# Patient Record
Sex: MALE | Race: WHITE | NOT HISPANIC OR LATINO | Employment: UNEMPLOYED | URBAN - METROPOLITAN AREA
[De-identification: names, ages, dates, MRNs, and addresses within clinical notes are randomized per-mention and may not be internally consistent; named-entity substitution may affect disease eponyms.]

---

## 2017-08-08 PROBLEM — G56.02 LEFT CARPAL TUNNEL SYNDROME: Status: ACTIVE | Noted: 2017-08-08

## 2017-08-08 PROBLEM — M65.342 TRIGGER RING FINGER OF LEFT HAND: Status: ACTIVE | Noted: 2017-08-08

## 2017-10-04 ENCOUNTER — OFFICE VISIT (OUTPATIENT)
Dept: NEUROLOGY | Facility: CLINIC | Age: 57
End: 2017-10-04
Payer: MEDICAID

## 2017-10-04 VITALS
HEART RATE: 89 BPM | HEIGHT: 71 IN | BODY MASS INDEX: 32.55 KG/M2 | WEIGHT: 232.5 LBS | SYSTOLIC BLOOD PRESSURE: 178 MMHG | RESPIRATION RATE: 20 BRPM | DIASTOLIC BLOOD PRESSURE: 94 MMHG

## 2017-10-04 DIAGNOSIS — I10 ESSENTIAL HYPERTENSION: ICD-10-CM

## 2017-10-04 DIAGNOSIS — G40.309 NONINTRACTABLE GENERALIZED IDIOPATHIC EPILEPSY WITHOUT STATUS EPILEPTICUS: ICD-10-CM

## 2017-10-04 DIAGNOSIS — Z51.81 THERAPEUTIC DRUG MONITORING: ICD-10-CM

## 2017-10-04 PROCEDURE — 99999 PR PBB SHADOW E&M-EST. PATIENT-LVL V: CPT | Mod: PBBFAC,,, | Performed by: PSYCHIATRY & NEUROLOGY

## 2017-10-04 PROCEDURE — 99205 OFFICE O/P NEW HI 60 MIN: CPT | Mod: S$PBB,,, | Performed by: PSYCHIATRY & NEUROLOGY

## 2017-10-04 PROCEDURE — 99215 OFFICE O/P EST HI 40 MIN: CPT | Mod: PBBFAC,PN | Performed by: PSYCHIATRY & NEUROLOGY

## 2017-10-04 RX ORDER — PROMETHAZINE HYDROCHLORIDE 25 MG/1
25 TABLET ORAL
COMMUNITY
End: 2017-10-04 | Stop reason: SDUPTHER

## 2017-10-04 RX ORDER — OXCARBAZEPINE 300 MG/1
300 TABLET, FILM COATED ORAL 2 TIMES DAILY
COMMUNITY
End: 2017-12-11

## 2017-10-04 RX ORDER — GLIMEPIRIDE 2 MG/1
2 TABLET ORAL DAILY
Refills: 2 | COMMUNITY
Start: 2017-09-25 | End: 2017-12-14

## 2017-10-04 RX ORDER — TAMSULOSIN HYDROCHLORIDE 0.4 MG/1
1 CAPSULE ORAL NIGHTLY
Refills: 10 | COMMUNITY
Start: 2017-09-14

## 2017-10-04 NOTE — PROGRESS NOTES
"Subjective:      10/24/2017       Patient ID: Randy Deng is a right handed 57 y.o.  male who presents for seizures    History of Present Illness    Reviewed outpatient records from Dr. Rivera, referred for hx of TIA.  Reviewed outpatient neurology clinic from her from [facility] and over neurology February 2017; of note examiners indicated the patient was not a good historian, did not have medications and was not accompanied by anyone to clarify inconsistencies in his report.    His chief complaint at that time was of back pain.  Reported a history of low back pain with sciatica peripheral polyneuropathy, and remote seizure disorder.  Long-standing, presumed diabetic neuropathy.  History of seizures; history of childhood seizures, stated he was in the hospital for 47 days at [facility] as a child for seizures where they "had to put tubes on either side of my head into my brain, and they injected a dye or something for my seizures."    That visit referenced lumbar spine MRI with some degenerative disease, no neural foraminal narrowing, no spinal stenosis, no cord compromise.  Referenced needle EMG in July 2016 without evidence of lumbosacral radiculopathy, given prescription for physical therapy.  EMG apparently did show bilateral lower sensory neuropathy.  Was given prescription for duloxetine 60 mg daily and lidocaine 5% cream.  They did order an EEG.  I do not have any results of that study.     He states he had seizures as a child, had bilateral ventriculostomies, question of a tumor?.  Recalls being on Dilantin up to age 21, when seizures quieted down. No seizures until about a year and a half ago.      + aura, feels like gasping for air.  He knows he goes out, witnesses tell him he shakes all over 3-5 minutes.  Feels slow afterwards, confused.  Says he keeps a metal spoon to put in his mouth.  He says he was started on anticonvulsant by a Dr. Tesfaye - I have records from [facility] from February, however he " states he was seen just a month ago and has been started  On something but doesn't know what.  He had an EEG study done about three months ago.     He is not sure when his last seizure was, might have been this morning.  He recalls sitting at the edge of the bed, and for 3-4 minutes was shaking and trembling which he recalls. Laid back down following.     He does not drive, has a  Friend drive him.     Review of patient's allergies indicates:   Allergen Reactions    Lisinopril Other (See Comments)     Angioedema     Metformin Other (See Comments)     Renal issues     Penicillins      Current Outpatient Prescriptions   Medication Sig Dispense Refill    albuterol (VENTOLIN HFA) 90 mcg/actuation inhaler INHALE 1-2 puffs into lungs EVERY 6 HOURS AS NEEDED FOR WHEEZING      avanafil 200 mg Tab Take 200 mg by mouth.      duloxetine (CYMBALTA) 60 MG capsule Take 60 mg by mouth.      glimepiride (AMARYL) 2 MG tablet Take 2 mg by mouth once daily.  2    glimepiride (AMARYL) 4 MG tablet TK 1 T PO D WITH DAVID OR THE FIRST MAIN MEAL OF THE DAY      indomethacin (INDOCIN) 50 MG capsule TK ONE C PO TID PRF GOUT FLARE-UP      lidocaine (XYLOCAINE) 5 % Oint ointment ROLY TOPICALLY TO AFFECTED AREA D      losartan (COZAAR) 100 MG tablet Take 100 mg by mouth.      oxcarbazepine (TRILEPTAL) 300 MG Tab Take 300 mg by mouth 2 (two) times daily.      promethazine (PHENERGAN) 25 MG tablet Take 25 mg by mouth.      sildenafil (VIAGRA) 100 MG tablet Take 100 mg by mouth.      tamsulosin (FLOMAX) 0.4 mg Cp24 Take 1 capsule by mouth nightly.  10    trazodone (DESYREL) 50 MG tablet TK 1 T PO QD       No current facility-administered medications for this visit.        Past Medical History  Past Medical History:   Diagnosis Date    Diabetes mellitus, type 2     Hypertension        Past Surgical History  No past surgical history on file.    Family History  No family history on file.    Social History  Social History     Social  History    Marital status:      Spouse name: N/A    Number of children: N/A    Years of education: N/A     Occupational History    Not on file.     Social History Main Topics    Smoking status: Never Smoker    Smokeless tobacco: Never Used    Alcohol use Yes      Comment: socially    Drug use: No    Sexual activity: Not on file     Other Topics Concern    Not on file     Social History Narrative    No narrative on file        Review of Systems  Review of Systems   Constitutional: Negative for appetite change, fatigue, fever and unexpected weight change.   HENT: Negative for congestion, hearing loss, nosebleeds, sinus pressure and trouble swallowing.    Eyes: Negative for pain and visual disturbance.   Respiratory: Positive for cough. Negative for shortness of breath and wheezing.    Cardiovascular: Negative for chest pain and palpitations.   Gastrointestinal: Negative for abdominal pain, blood in stool, diarrhea, nausea and vomiting.   Endocrine: Negative for cold intolerance and heat intolerance.   Genitourinary: Negative for difficulty urinating, hematuria and urgency.   Musculoskeletal: Positive for arthralgias and myalgias. Negative for back pain, gait problem and neck pain.   Skin: Negative for rash and wound.   Neurological: Positive for seizures, weakness and numbness. Negative for dizziness.   Hematological: Bruises/bleeds easily.   Psychiatric/Behavioral: Negative for decreased concentration, dysphoric mood and sleep disturbance.       Objective:        Vitals:    10/04/17 0911   BP: (!) 178/94   Pulse: 89   Resp:      Body mass index is 32.43 kg/m².  Neurologic Exam     Mental Status   Oriented to person, place, and time.   Registration: recalls 3 of 3 objects. Recall at 5 minutes: recalls 2 of 3 objects.   Attention: normal. Concentration: decreased.   Speech: speech is normal   Level of consciousness: alert  Able to name object. Able to repeat. Normal comprehension.     Cranial Nerves      CN II   Visual fields full to confrontation.     CN III, IV, VI   Pupils are equal, round, and reactive to light.  Extraocular motions are normal.   CN III: no CN III palsy  CN VI: no CN VI palsy    CN V   Facial sensation intact.     CN VII   Facial expression full, symmetric.     CN IX, X   CN IX normal.   Palate: symmetric    CN XI   CN XI normal.     CN XII   CN XII normal.     Motor Exam   Muscle bulk: normal  Overall muscle tone: normal    Strength   Strength 5/5 except as noted. Decreased  on the left, give way weakness at the shoulders b/l and left leg     Gait, Coordination, and Reflexes     Gait  Gait: (limps, difficulty weight bearing on the left)    Coordination   Romberg: positive  Finger-nose-finger test: tremulous, mildly ataxic.  Tandem walking coordination: abnormal (falls to the right)    Tremor   Resting tremor: present  Intention tremor: present  Action tremor: left arm and right arm    Reflexes   Right brachioradialis: 1+  Left brachioradialis: 1+  Right biceps: 1+  Left biceps: 1+  Right triceps: 1+  Left triceps: 1+  Right patellar: 2+  Left patellar: 2+  Right achilles: 1+  Left achilles: 1+Tremulous with activity and sustained posture       Physical Exam   Constitutional: He is oriented to person, place, and time.   Eyes: EOM are normal. Pupils are equal, round, and reactive to light.   Neurological: He is oriented to person, place, and time. He has an abnormal Romberg Test and an abnormal Tandem Gait Test (falls to the right). Finger-nose-finger test: tremulous, mildly ataxic.   Reflex Scores:       Tricep reflexes are 1+ on the right side and 1+ on the left side.       Bicep reflexes are 1+ on the right side and 1+ on the left side.       Brachioradialis reflexes are 1+ on the right side and 1+ on the left side.       Patellar reflexes are 2+ on the right side and 2+ on the left side.       Achilles reflexes are 1+ on the right side and 1+ on the left side.  Psychiatric: His speech  is normal.         Data Review:     Reviewed outpatient records from [facility].  Lab studies June 1, 2017 include sodium 133, potassium 5.3, glucose 97, BUN 10, creatinine 0.97.  AST, ALT normal.  Total protein 7.6.  Uric acid 5.6.  HDL 81, LDL 34.  White blood cell count 9.5, platelet 279, hematocrit 39.3.    Brain MRI reports December 6, 2016 shows remote bilateral ventriculostomies.  No evidence of acute territorial infarct or intracranial hemorrhage.  Diffuse parenchymal volume loss without acute hydrocephalus.    Assessment:       1. Nonintractable generalized idiopathic epilepsy without status epilepticus    2. Essential hypertension    3. Therapeutic drug monitoring        Plan:         Problem List Items Addressed This Visit        Neuro    Nonintractable generalized idiopathic epilepsy without status epilepticus    Current Assessment & Plan     He is currently on therapy with oxcarbazepine; we will need to get the more recent records from neurology including lab studies and EEG to review to avoid unnecessary duplication of studies.  Advised him never to have anyone put anything in his mouth with seizure, reviewed seizure safety and precautions (heights, water, etc) and importance of compliance with medications.     Once we have more information, will need routine monitoring while on Trileptal, possible EEG or ambulatory monitoring, and will need him to come in next visit with someone that can provide additional history            Cardiac/Vascular    Essential hypertension       Other    Therapeutic drug monitoring        Over 60 minutes time spent with patient, > 50% in discussion and counseling with patient regarding diagnosis, prognosis and treatment strategies      Return in about 3 months (around 1/4/2018).        Patient information shared at the time of visit:      Thank you very much for the opportunity to assist in this patient's care.  If you have any questions or concerns, please  do not hesitate to contact me at any time.     Sincerely,  Cristofer Greenberg, DO

## 2017-10-16 ENCOUNTER — TELEPHONE (OUTPATIENT)
Dept: NEUROLOGY | Facility: CLINIC | Age: 57
End: 2017-10-16

## 2017-10-16 NOTE — TELEPHONE ENCOUNTER
Records received from [facility], sent to scanning.

## 2017-10-24 PROBLEM — Z51.81 THERAPEUTIC DRUG MONITORING: Status: ACTIVE | Noted: 2017-10-24

## 2017-10-24 PROBLEM — G40.309 NONINTRACTABLE GENERALIZED IDIOPATHIC EPILEPSY WITHOUT STATUS EPILEPTICUS: Status: ACTIVE | Noted: 2017-10-24

## 2017-10-24 PROBLEM — I10 ESSENTIAL HYPERTENSION: Status: ACTIVE | Noted: 2017-10-24

## 2017-10-24 NOTE — ASSESSMENT & PLAN NOTE
He is currently on therapy with oxcarbazepine; we will need to get the more recent records from neurology including lab studies and EEG to review to avoid unnecessary duplication of studies.  Advised him never to have anyone put anything in his mouth with seizure, reviewed seizure safety and precautions (heights, water, etc) and importance of compliance with medications.     Once we have more information, will need routine monitoring while on Trileptal, possible EEG or ambulatory monitoring, and will need him to come in next visit with someone that can provide additional history

## 2017-12-11 ENCOUNTER — OFFICE VISIT (OUTPATIENT)
Dept: NEUROLOGY | Facility: CLINIC | Age: 57
End: 2017-12-11
Payer: MEDICAID

## 2017-12-11 VITALS
BODY MASS INDEX: 33.13 KG/M2 | HEIGHT: 71 IN | RESPIRATION RATE: 18 BRPM | SYSTOLIC BLOOD PRESSURE: 163 MMHG | WEIGHT: 236.63 LBS | DIASTOLIC BLOOD PRESSURE: 77 MMHG

## 2017-12-11 DIAGNOSIS — G63 POLYNEUROPATHY ASSOCIATED WITH UNDERLYING DISEASE: ICD-10-CM

## 2017-12-11 DIAGNOSIS — Z92.89 HISTORY OF VENTRICULOPERITONEAL SHUNTING: ICD-10-CM

## 2017-12-11 DIAGNOSIS — G40.309 NONINTRACTABLE GENERALIZED IDIOPATHIC EPILEPSY WITHOUT STATUS EPILEPTICUS: Primary | ICD-10-CM

## 2017-12-11 DIAGNOSIS — Z51.81 THERAPEUTIC DRUG MONITORING: ICD-10-CM

## 2017-12-11 DIAGNOSIS — M54.32 SCIATICA OF LEFT SIDE: ICD-10-CM

## 2017-12-11 PROCEDURE — 99214 OFFICE O/P EST MOD 30 MIN: CPT | Mod: S$PBB,,, | Performed by: PSYCHIATRY & NEUROLOGY

## 2017-12-11 PROCEDURE — 99214 OFFICE O/P EST MOD 30 MIN: CPT | Mod: PBBFAC,PN | Performed by: PSYCHIATRY & NEUROLOGY

## 2017-12-11 PROCEDURE — 99999 PR PBB SHADOW E&M-EST. PATIENT-LVL IV: CPT | Mod: PBBFAC,,, | Performed by: PSYCHIATRY & NEUROLOGY

## 2017-12-11 RX ORDER — GLUCOSAM/CHON-MSM1/C/MANG/BOSW 500-416.6
TABLET ORAL
Refills: 1 | COMMUNITY
Start: 2017-10-23 | End: 2018-09-07

## 2017-12-11 RX ORDER — GLIMEPIRIDE 4 MG/1
4 TABLET ORAL
COMMUNITY
Start: 2017-11-21 | End: 2018-02-15

## 2017-12-11 RX ORDER — TRAZODONE HYDROCHLORIDE 50 MG/1
50 TABLET ORAL
COMMUNITY
Start: 2017-11-13 | End: 2017-12-14

## 2017-12-11 RX ORDER — BUDESONIDE AND FORMOTEROL FUMARATE DIHYDRATE 160; 4.5 UG/1; UG/1
2 AEROSOL RESPIRATORY (INHALATION) EVERY 12 HOURS
COMMUNITY
Start: 2017-10-26

## 2017-12-11 RX ORDER — BLOOD-GLUCOSE METER
EACH MISCELLANEOUS
Refills: 0 | COMMUNITY
Start: 2017-10-23 | End: 2018-09-07

## 2017-12-11 RX ORDER — TAMSULOSIN HYDROCHLORIDE 0.4 MG/1
1 CAPSULE ORAL
COMMUNITY
Start: 2017-09-14 | End: 2017-12-14

## 2017-12-11 RX ORDER — OXCARBAZEPINE 600 MG/1
600 TABLET, FILM COATED ORAL 2 TIMES DAILY
Qty: 60 TABLET | Refills: 11 | Status: SHIPPED | OUTPATIENT
Start: 2017-12-11 | End: 2017-12-14

## 2017-12-11 RX ORDER — LIDOCAINE 50 MG/G
OINTMENT TOPICAL
COMMUNITY
Start: 2017-11-25 | End: 2017-12-14

## 2017-12-11 RX ORDER — OXCARBAZEPINE 300 MG/1
300 TABLET, FILM COATED ORAL
COMMUNITY
End: 2017-12-11

## 2017-12-11 RX ORDER — CALCIUM CITRATE/VITAMIN D3 200MG-6.25
TABLET ORAL
Refills: 1 | COMMUNITY
Start: 2017-11-20 | End: 2018-09-07

## 2017-12-11 RX ORDER — AMLODIPINE BESYLATE 10 MG/1
TABLET ORAL
Refills: 1 | COMMUNITY
Start: 2017-11-22 | End: 2018-09-07

## 2017-12-11 RX ORDER — DICLOFENAC SODIUM 10 MG/G
GEL TOPICAL
Refills: 0 | COMMUNITY
Start: 2017-10-26 | End: 2018-09-07

## 2017-12-11 RX ORDER — NAPROXEN 500 MG/1
500 TABLET ORAL 2 TIMES DAILY PRN
Refills: 0 | COMMUNITY
Start: 2017-10-23 | End: 2017-12-11

## 2017-12-11 RX ORDER — METHYLPREDNISOLONE 4 MG/1
TABLET ORAL
Qty: 1 PACKAGE | Refills: 0 | Status: SHIPPED | OUTPATIENT
Start: 2017-12-11 | End: 2018-01-01

## 2017-12-11 RX ORDER — METOPROLOL TARTRATE 25 MG/1
25 TABLET, FILM COATED ORAL
COMMUNITY
Start: 2017-10-19 | End: 2018-09-07

## 2017-12-11 RX ORDER — LOSARTAN POTASSIUM 25 MG/1
25 TABLET ORAL DAILY
Refills: 1 | COMMUNITY
Start: 2017-11-30 | End: 2018-09-07

## 2017-12-11 NOTE — PROGRESS NOTES
"Subjective:         Patient ID: Randy Deng is a 57 y.o.  male who presents for follow up of generalized epilepsy since childhooid    Initial / Last History of Present Illness:  Reviewed outpatient records from Dr. Rivera, referred for hx of TIA.  Reviewed outpatient neurology clinic from her from [facility] and over neurology February 2017; of note examiners indicated the patient was not a good historian, did not have medications and was not accompanied by anyone to clarify inconsistencies in his report.     His chief complaint at that time was of back pain.  Reported a history of low back pain with sciatica peripheral polyneuropathy, and remote seizure disorder.  Long-standing, presumed diabetic neuropathy.  History of seizures; history of childhood seizures, stated he was in the hospital for 47 days at [facility] as a child for seizures where they "had to put tubes on either side of my head into my brain, and they injected a dye or something for my seizures."     That visit referenced lumbar spine MRI with some degenerative disease, no neural foraminal narrowing, no spinal stenosis, no cord compromise.  Referenced needle EMG in July 2016 without evidence of lumbosacral radiculopathy, given prescription for physical therapy.  EMG apparently did show bilateral lower sensory neuropathy.  Was given prescription for duloxetine 60 mg daily and lidocaine 5% cream.  They did order an EEG.  I do not have any results of that study.      He states he had seizures as a child, had bilateral ventriculostomies, question of a tumor?.  Recalls being on Dilantin up to age 21, when seizures quieted down. No seizures until about a year and a half ago.       + aura, feels like gasping for air.  He knows he goes out, witnesses tell him he shakes all over 3-5 minutes.  Feels slow afterwards, confused.  Says he keeps a metal spoon to put in his mouth.  He says he was started on anticonvulsant by a Dr. Tesfaye - I have records from " [facility] from February, however he states he was seen just a month ago and has been started  On something but doesn't know what.  He had an EEG study done about three months ago.      He is not sure when his last seizure was, might have been this morning.  He recalls sitting at the edge of the bed, and for 3-4 minutes was shaking and trembling which he recalls. Laid back down following.      He does not drive, has a  Friend drive him.    Interval history since last visit:    Records available since last visit from [facility] resident clinic:    MRI brain report 12/2016 - remote bilateral ventriculostomies.  No acute abnormalities.  Diffuse volume loss, no acute hydrocephalus.     EEG 7/2017 - showed intermittent bitemporal slow waves occurring independently, L>R; no epileptiform activity.    9/2017 neurology clinic follow up - (Dr. Eng, [facility]) started  BID    He reports having had one seizure in the past 2 months.  Per his friend lasted about 90 seconds, + bit tongue.  Was briefly post-ictal.  He is taking the Trileptal 300 mg BID, at this point no significant side effects.     + sciatic pain shooting down the left leg, + back pain.  All day, every day.  Has been a problem for the past 7 years.  Naproxyn not helping, ibuprofen seems to work better    Review of patient's allergies indicates:   Allergen Reactions    Lisinopril Other (See Comments)     Angioedema     Metformin Other (See Comments)     Renal issues     Penicillins      Current Outpatient Prescriptions   Medication Sig Dispense Refill    albuterol (VENTOLIN HFA) 90 mcg/actuation inhaler INHALE 1-2 puffs into lungs EVERY 6 HOURS AS NEEDED FOR WHEEZING      amLODIPine (NORVASC) 10 MG tablet Take 1 tablet (10 mg total) by mouth daily.  1    avanafil 200 mg Tab Take 200 mg by mouth.      blood-gluc meter-wrist BP mntr Jane Use as directed      budesonide-formoterol 160-4.5 mcg (SYMBICORT) 160-4.5 mcg/actuation  HFAA Inhale 2 puffs into the lungs.      duloxetine (CYMBALTA) 60 MG capsule Take 60 mg by mouth.      glimepiride (AMARYL) 2 MG tablet Take 2 mg by mouth once daily.  2    glimepiride (AMARYL) 4 MG tablet TK 1 T PO D WITH DAVID OR THE FIRST MAIN MEAL OF THE DAY      glimepiride (AMARYL) 4 MG tablet Take 4 mg by mouth.      lidocaine (XYLOCAINE) 5 % Oint ointment ROLY TOPICALLY TO AFFECTED AREA D      lidocaine (XYLOCAINE) 5 % Oint ointment       losartan (COZAAR) 100 MG tablet Take 100 mg by mouth.      metoprolol tartrate (LOPRESSOR) 25 MG tablet Take 25 mg by mouth.      promethazine (PHENERGAN) 25 MG tablet Take 25 mg by mouth.      sildenafil (VIAGRA) 100 MG tablet Take 100 mg by mouth.      tamsulosin (FLOMAX) 0.4 mg Cp24 Take 1 capsule by mouth nightly.  10    tamsulosin (FLOMAX) 0.4 mg Cp24 Take 1 capsule by mouth.      trazodone (DESYREL) 50 MG tablet TK 1 T PO QD      traZODone (DESYREL) 50 MG tablet Take 50 mg by mouth.      diclofenac sodium 1 % Gel APPLY TWO grams TOPICALLY EVERY 6 HOURS AS NEEDED  0    losartan (COZAAR) 25 MG tablet Take 25 mg by mouth once daily.  1    methylPREDNISolone (MEDROL DOSEPACK) 4 mg tablet use as directed 1 Package 0    OXcarbazepine (TRILEPTAL) 600 MG Tab Take 1 tablet (600 mg total) by mouth 2 (two) times daily. 60 tablet 11    TRUE METRIX GLUCOSE METER Misc Use as directed  0    TRUE METRIX GLUCOSE TEST STRIP Strp test FOUR TIMES DAILY BEFORE MEALS and nightly  1    TRUEPLUS LANCETS 30 gauge Misc test FOUR TIMES DAILY BEFORE MEALS and nightly  1     No current facility-administered medications for this visit.          Review of Systems  Review of Systems    Objective:        Vitals:    12/11/17 0808   BP: (!) 163/77   Resp: 18     Body mass index is 33 kg/m².  Neurologic Exam     Mental Status   Oriented to person, place, and time.   Attention: normal. Concentration: normal.   Speech: speech is normal   Level of consciousness: alert    Cranial Nerves      CN II   Visual fields full to confrontation.     CN III, IV, VI   Pupils are equal, round, and reactive to light.  Extraocular motions are normal.   CN III: no CN III palsy  CN VI: no CN VI palsy  Nystagmus: none   Ophthalmoparesis: none    CN V   Facial sensation intact.     CN VII   Facial expression full, symmetric.     CN VIII   CN VIII normal.     CN IX, X   CN IX normal.     CN XI   CN XI normal.     CN XII   CN XII normal.     Motor Exam   Muscle bulk: normal  Overall muscle tone: normal  Right arm pronator drift: absent  Left arm pronator drift: absentNo weakness, pain on effort left leg     Gait, Coordination, and Reflexes     Tremor   Resting tremor: absentPostural tremor b/l UE       Physical Exam   Constitutional: He is oriented to person, place, and time.   Eyes: EOM are normal. Pupils are equal, round, and reactive to light.   Neurological: He is oriented to person, place, and time.   Psychiatric: His speech is normal.         Data Review:    Assessment:        1. Nonintractable generalized idiopathic epilepsy without status epilepticus    2. History of ventriculoperitoneal shunting    3. Polyneuropathy associated with underlying disease    4. Therapeutic drug monitoring    5. Sciatica of left side           Plan:         Problem List Items Addressed This Visit        Neuro    Nonintractable generalized idiopathic epilepsy without status epilepticus - Primary    Current Assessment & Plan     Encouraged to keep written log of seizure activity  Check drug level, monitoring labs.  Anticipate he will have some hyponatremia - need to monitor         Relevant Medications    OXcarbazepine (TRILEPTAL) 600 MG Tab    History of ventriculoperitoneal shunting    Polyneuropathy associated with underlying disease    Overview     Diabetes            Other    Therapeutic drug monitoring    Current Assessment & Plan     CBC, CMP, Oxcarbazepine level         Relevant Orders    CBC auto differential    Comprehensive  metabolic panel    Oxcarbazepine level      Other Visit Diagnoses     Sciatica of left side        Relevant Orders    MRI Lumbar Spine Without Contrast    Ambulatory Consult to Back & Spine Clinic    Ambulatory consult to Physical Therapy      Stop taking the naprosyn, and do not take any ibuprofen while on the Medrol dosepak.  Will set up an MRI of the low back and get you in to physical therapy, and the spine clinic    Increase the Trileptal to 600 mg one pill twice a day    Keep a written record of any seizures you are having so we can monitor them over time, and let me know if you have more on the higher dose    Return in about 3 months (around 3/11/2018).       Thank you very much for the opportunity to assist in this patient's care.  If you have any questions or concerns, please do not hesitate to contact me at any time.     Sincerely,  Cristofer Greenberg, DO

## 2017-12-11 NOTE — PATIENT INSTRUCTIONS
Stop taking the naprosyn, and do not take any ibuprofen while on the Medrol dosepak.  Will set up an MRI of the low back and get you in to physical therapy, and the spine clinic    Increase the Trileptal to 600 mg one pill twice a day    Keep a written record of any seizures you are having so we can monitor them over time, and let me know if you have more on the higher dose

## 2017-12-11 NOTE — ASSESSMENT & PLAN NOTE
Encouraged to keep written log of seizure activity  Check drug level, monitoring labs.  Anticipate he will have some hyponatremia - need to monitor

## 2017-12-14 ENCOUNTER — OFFICE VISIT (OUTPATIENT)
Dept: INTERNAL MEDICINE | Facility: CLINIC | Age: 57
End: 2017-12-14
Payer: MEDICAID

## 2017-12-14 VITALS
RESPIRATION RATE: 16 BRPM | WEIGHT: 239.38 LBS | OXYGEN SATURATION: 98 % | BODY MASS INDEX: 33.51 KG/M2 | HEIGHT: 71 IN | DIASTOLIC BLOOD PRESSURE: 82 MMHG | SYSTOLIC BLOOD PRESSURE: 156 MMHG | TEMPERATURE: 98 F | HEART RATE: 97 BPM

## 2017-12-14 DIAGNOSIS — M54.16 LEFT LUMBAR RADICULITIS: Primary | ICD-10-CM

## 2017-12-14 PROCEDURE — 99204 OFFICE O/P NEW MOD 45 MIN: CPT | Mod: ,,, | Performed by: PHYSICAL MEDICINE & REHABILITATION

## 2017-12-14 NOTE — PROGRESS NOTES
Subjective:       Patient ID: Randy Deng is a 57 y.o. male.    Chief Complaint: sciatic nerve pain    This is a 57-year-old man who sees Dr. Em in [place] for his primary care. He has a history of hypertension and diabetes associated with diabetic peripheral neuropathy. He also has a history of epilepsy and is currently under the care of Dr. Greenberg, a neurologist. He denies any personal history of cancer He presents to me with a seven-year history of left posterior leg discomfort from the left gluteal region down to the distal portion of the left calf. He claims the pain is intermittent in nature and better when he lies on his right side. When he lies on his right side he can get his pain level down to 0. At its worst the pain is an 8 out of 10 and is worsened by sitting and riding in a car or walking. Bending forward worsens his pain. He denies any changes in his bowel and bladder habits since the onset of his pain. He denies weakness. He formerly was self-employed in seafood industry but has not been employed for many years due to the inability to lift. Despite the fact that the patient has a long history of left leg radicular pains, he claims he has had no specific treatment for his condition. I did review his neurologist notes and I see that he presented to his neurologist on or about 12/11/2017 with complaints of left leg pain. He was prescribed a Medrol Dosepak which the patient hasn't started yet. He was also instructed to go to outpatient physical therapy which he has not yet done. An MRI of the lumbar spine was ordered and is to be done on 12/26/2017.    The patient denies chest pain, shortness of breath, fever, chills, night sweats or unexpected weight loss. The patient denies any changes to bowel and bladder function since the onset of symptoms. Specifically denies fecal incontinence. Specifically denies urinary retention or incontinence. Denies  perineal or perianal numbness.     Oswestry low  "back pain disability questionnaire score was 64% suggesting that the patient's pain impinges on all aspects of his life.      Review of Systems   Constitutional: Negative for chills, diaphoresis, fatigue, fever and unexpected weight change.   HENT: Negative for trouble swallowing.    Eyes: Negative for visual disturbance.   Respiratory: Negative for shortness of breath.    Cardiovascular: Negative for chest pain.   Gastrointestinal: Negative for abdominal pain, constipation, diarrhea, nausea and vomiting.   Genitourinary: Negative for difficulty urinating.   Musculoskeletal: Negative for arthralgias, back pain, gait problem, joint swelling, myalgias, neck pain and neck stiffness.   Neurological: Negative for dizziness, speech difficulty, weakness, light-headedness, numbness and headaches.       Objective:      Physical Exam   Constitutional: He is oriented to person, place, and time. He appears well-developed.   Neurological: He is alert and oriented to person, place, and time. He has normal strength and normal reflexes.   The patient had increased pain at the left lower lumbar region radiating into his left thigh with flexion to about 30°. Extension of the lumbar spine improved his symptoms.  Straight leg raise was painless on the right and reproduced the patient's left leg radicular symptoms.    For the purposes of today's evaluation, a "normal" physical examination includes the following components:    Standing Evaluation  Inspection-No external lesions or atrophy of the cervical or lumbar spine was noted     Palpation-No point tenderness or palpable masses were noted about the cervical or lumbar spine      Heel walk-No evidence of ankle dorsiflexion weakness    Toe walk- No evidence of plantar flexion weakness    Saddle sensation- No loss of sensation to light touch in the upper gluteal cleft     Seated  Cervical ROM- Full painless ROM in flexion, extension, rotation and lateral bending    Spurlings maneuver- " No reproduction or worsening of cervical radicular symptoms with rotation in conjunction with extension of the cervical spine    Reflexes- +1-+2 reflexes at the following:   C5-Biceps   C6-Brachioradialis   C7-Triceps   L3/4-Patellar   S1-Achilles     Lara- No evidence for upper motor neuron disease    Babinski- No evidence for upper motor neuron disease    Clonus-No evidence for upper motor neuron disease      ANA LUISA- No back or groin pain with flexion, abduction and external rotation of the hips    Prone extension if possible- Five repetitions improves or has no effect on back pain symptoms    Strength testing- 5/5 strength in the following muscle groups:  C5-Elbow flexion  C6-Wrist extension  C7-Elbow extension  C8-Finger flexion  T1-Finger abduction  L2-Hip flexion  L3-Knee extension  L4-Ankle dorsiflexion  L5-Great toe extension  S1-Ankle plantar flexion     Nursing note and vitals reviewed.      Assessment:    1. Left lumbar radiculitis           Plan:     the patient presents with chronic left leg radicular symptoms. He has positive straight leg raising consistent with his diagnosis and presenting symptoms. I do not detect any hard neurological deficits on this evaluation today. Certainly no evidence for any neurological red flags. The patient presents in no acute distress and I agree with the treatment plan as outlined by his neurologist. He needs to start his Medrol Dosepak and physical therapy as prescribed. I will see him back in clinic after he has the MRI

## 2018-01-19 ENCOUNTER — TELEPHONE (OUTPATIENT)
Dept: NEUROLOGY | Facility: CLINIC | Age: 58
End: 2018-01-19

## 2018-01-19 NOTE — TELEPHONE ENCOUNTER
----- Message from Tereza Andujar sent at 1/19/2018  9:37 AM CST -----  Schedule MRI and blood work for the same day.  Please call [phone]

## 2018-02-01 ENCOUNTER — HOSPITAL ENCOUNTER (OUTPATIENT)
Dept: RADIOLOGY | Facility: HOSPITAL | Age: 58
Discharge: HOME | End: 2018-02-01
Attending: PSYCHIATRY & NEUROLOGY
Payer: MEDICAID

## 2018-02-01 DIAGNOSIS — M54.32 SCIATICA OF LEFT SIDE: ICD-10-CM

## 2018-02-01 PROCEDURE — 72148 MRI LUMBAR SPINE W/O DYE: CPT | Mod: 26,,, | Performed by: RADIOLOGY

## 2018-02-01 PROCEDURE — 72148 MRI LUMBAR SPINE W/O DYE: CPT | Mod: TC,PO

## 2018-02-08 ENCOUNTER — TELEPHONE (OUTPATIENT)
Dept: NEUROLOGY | Facility: CLINIC | Age: 58
End: 2018-02-08

## 2018-02-08 NOTE — TELEPHONE ENCOUNTER
----- Message from Gildardo Little sent at 2/8/2018 11:30 AM CST -----  Contact: same  Patient called in and stated he got all of his test scheduled that Dr. Greenberg wanted him to have and wanted to see when he could reschedule his appt that he missed in December??  This all dates back to 10/2017.    Patient call back number is [phone]

## 2018-02-15 ENCOUNTER — OFFICE VISIT (OUTPATIENT)
Dept: NEUROLOGY | Facility: CLINIC | Age: 58
End: 2018-02-15
Payer: MEDICAID

## 2018-02-15 VITALS
WEIGHT: 231.13 LBS | SYSTOLIC BLOOD PRESSURE: 142 MMHG | RESPIRATION RATE: 17 BRPM | HEIGHT: 71 IN | DIASTOLIC BLOOD PRESSURE: 72 MMHG | BODY MASS INDEX: 32.36 KG/M2

## 2018-02-15 DIAGNOSIS — F10.19 DISORDER DUE TO ALCOHOL ABUSE: Primary | ICD-10-CM

## 2018-02-15 DIAGNOSIS — G63 POLYNEUROPATHY ASSOCIATED WITH UNDERLYING DISEASE: ICD-10-CM

## 2018-02-15 DIAGNOSIS — G40.309 NONINTRACTABLE GENERALIZED IDIOPATHIC EPILEPSY WITHOUT STATUS EPILEPTICUS: ICD-10-CM

## 2018-02-15 DIAGNOSIS — Z92.89 HISTORY OF VENTRICULOPERITONEAL SHUNTING: ICD-10-CM

## 2018-02-15 PROCEDURE — 99215 OFFICE O/P EST HI 40 MIN: CPT | Mod: S$PBB,,, | Performed by: PSYCHIATRY & NEUROLOGY

## 2018-02-15 PROCEDURE — 99999 PR PBB SHADOW E&M-EST. PATIENT-LVL III: CPT | Mod: PBBFAC,,, | Performed by: PSYCHIATRY & NEUROLOGY

## 2018-02-15 PROCEDURE — 99213 OFFICE O/P EST LOW 20 MIN: CPT | Mod: PBBFAC,PN | Performed by: PSYCHIATRY & NEUROLOGY

## 2018-02-15 NOTE — PROGRESS NOTES
"Subjective:          Patient ID: Randy Deng is a 57 y.o.  male who presents for follow up of seizures    Initial / Last History of Present Illness:    Reviewed outpatient records from Dr. Rivera, referred for hx of TIA.  Reviewed outpatient neurology clinic from her from [facility] and over neurology February 2017; of note examiners indicated the patient was not a good historian, did not have medications and was not accompanied by anyone to clarify inconsistencies in his report.     His chief complaint at that time was of back pain.  Reported a history of low back pain with sciatica peripheral polyneuropathy, and remote seizure disorder.  Long-standing, presumed diabetic neuropathy.  History of seizures; history of childhood seizures, stated he was in the hospital for 47 days at [facility] as a child for seizures where they "had to put tubes on either side of my head into my brain, and they injected a dye or something for my seizures."     That visit referenced lumbar spine MRI with some degenerative disease, no neural foraminal narrowing, no spinal stenosis, no cord compromise.  Referenced needle EMG in July 2016 without evidence of lumbosacral radiculopathy, given prescription for physical therapy.  EMG apparently did show bilateral lower sensory neuropathy.  Was given prescription for duloxetine 60 mg daily and lidocaine 5% cream.  They did order an EEG.  I do not have any results of that study.      He states he had seizures as a child, had bilateral ventriculostomies, question of a tumor?.  Recalls being on Dilantin up to age 21, when seizures quieted down. No seizures until about a year and a half ago.       + aura, feels like gasping for air.  He knows he goes out, witnesses tell him he shakes all over 3-5 minutes.  Feels slow afterwards, confused.  Says he keeps a metal spoon to put in his mouth.  He says he was started on anticonvulsant by a Dr. Tesfaye - I have records from [facility] from February, " however he states he was seen just a month ago and has been started  On something but doesn't know what.  He had an EEG study done about three months ago.      He is not sure when his last seizure was, might have been this morning.  He recalls sitting at the edge of the bed, and for 3-4 minutes was shaking and trembling which he recalls. Laid back down following.      He does not drive, has a  Friend drive him.     12/11/17:     Records available since last visit from [facility] Neurology resident clinic:     MRI brain report 12/2016 - remote bilateral ventriculostomies.  No acute abnormalities.  Diffuse volume loss, no acute hydrocephalus.      EEG 7/2017 - showed intermittent bitemporal slow waves occurring independently, L>R; no epileptiform activity.     9/2017 neurology clinic follow up - (Dr. Eng, [facility] resident clinic) started  BID     He reports having had one seizure in the past 2 months.  Per his friend lasted about 90 seconds, + bit tongue.  Was briefly post-ictal.  He is taking the Trileptal 300 mg BID, at this point no significant side effects.      + sciatic pain shooting down the left leg, + back pain.  All day, every day.  Has been a problem for the past 7 years.  Naproxyn not helping, ibuprofen seems to work better    2/15/17 Interval history since last visit:    December 14, 2017 saw Dr. Robles in internal medicine with attention to his radicular symptoms, agreed with medrol dosepak, PT and imaging.     He is taking the Trileptal 600 mg BID; he feels like he is tolerating the medication well but his wife is concerned he may be having side effects - he seems more spaced out, is also more short tempered.  She feels he is more easily angered or agitated. We discussed his drinking - he is drinking between 6-8 beers a day, usually at home.  He has gone through detox in the hospital at [facility] in the past, had significant withdrawal sx.     Pain in the left leg did respond briefly  to medrol dose kaia, had 2-3 weeks of relief but has come back.     Review of patient's allergies indicates:   Allergen Reactions    Lisinopril Other (See Comments)     Angioedema     Metformin Other (See Comments)     Renal issues     Penicillins      Current Outpatient Prescriptions   Medication Sig Dispense Refill    amLODIPine (NORVASC) 10 MG tablet Take 1 tablet (10 mg total) by mouth daily.  1    blood-gluc meter-wrist BP mntr Jane Use as directed      budesonide-formoterol 160-4.5 mcg (SYMBICORT) 160-4.5 mcg/actuation HFAA Inhale 2 puffs into the lungs.      diclofenac sodium 1 % Gel APPLY TWO grams TOPICALLY EVERY 6 HOURS AS NEEDED  0    duloxetine (CYMBALTA) 60 MG capsule Take 60 mg by mouth.      INSULIN GLARGINE,HUM.REC.ANLOG (LANTUS SUBQ) Inject into the skin.      lidocaine (XYLOCAINE) 5 % Oint ointment ROLY TOPICALLY TO AFFECTED AREA D      losartan (COZAAR) 25 MG tablet Take 25 mg by mouth once daily.  1    metoprolol tartrate (LOPRESSOR) 25 MG tablet Take 25 mg by mouth.      tamsulosin (FLOMAX) 0.4 mg Cp24 Take 1 capsule by mouth nightly.  10    trazodone (DESYREL) 50 MG tablet TK 1 T PO QD      TRUE METRIX GLUCOSE METER Misc Use as directed  0    TRUE METRIX GLUCOSE TEST STRIP Strp test FOUR TIMES DAILY BEFORE MEALS and nightly  1    TRUEPLUS LANCETS 30 gauge Misc test FOUR TIMES DAILY BEFORE MEALS and nightly  1    promethazine (PHENERGAN) 25 MG tablet Take 25 mg by mouth.       No current facility-administered medications for this visit.          Review of Systems  Review of Systems    Objective:        Vitals:    02/15/18 0908   BP: (!) 142/72   Resp: 17     Body mass index is 32.23 kg/m².  Neurologic Exam    Physical Exam      Data Review:  Narrative     L2-3:  There is a minimal posterior disc bulge extending less than 2 mm into the spinal canal. There is minimal thecal sac compression and there is no foraminal compromise.    L3-4:  There is no significant compromise of the  spinal canal or foramina.    L4-5:  There is degenerative facet arthrosis with ligamentum flavum thickening, joint space narrowing, and posterior periarticular osteophyte formation. The facet arthrosis results in 3 mm anterior L4 subluxation. There is minimal L4-5 disc bulge and there is mild bilateral foraminal stenosis.    L5-S1:  There is a minimal posterior disc bulge causing no nerve root impingement or foraminal compromise.    Vertebral body heights are well-maintained and bone marrow signal is unremarkable. The distal spinal cord appears normal.   Impression       1. L2-3 and L5-S1 minimal disc bulges.  2. L5-S1 degenerative facet arthrosis causing mild anterior L4 subluxation. There is minimal L4-5 disc bulge with mild bilateral foraminal stenosis.      Electronically signed by: Gilbert Erickson  Date: 02/01/18  Time: 11:40        Assessment:        1. Disorder due to alcohol abuse    2. Polyneuropathy associated with underlying disease    3. Nonintractable generalized idiopathic epilepsy without status epilepticus    4. History of ventriculoperitoneal shunting           Plan:         Problem List Items Addressed This Visit        Neuro    Nonintractable generalized idiopathic epilepsy without status epilepticus    History of ventriculoperitoneal shunting    Polyneuropathy associated with underlying disease    Overview     Diabetes            Psychiatric    Disorder due to alcohol abuse - Primary    Current Assessment & Plan     Spoke very frankly with him and his wife about his drinking - he has a significant history of drinking, and even though it is less than previous levels is on a daily basis, in the setting of medications which will not interact well.  Very concerned about his risk of withdrawal and seizure or toxicity with medications.  Counseled him regarding inpatient treatment facilities, and inpatient detox centers.  I gave him some resources and encouraged him to go for detox as soon as possible.   He is concerned about financial issues, new business etc. but will consider within the next few weeks.                 No Follow-up on file.     Over 40 minutes time spent with patient, > 50% in discussion and counseling with patient regarding diagnosis, prognosis and treatment strategies    Thank you very much for the opportunity to assist in this patient's care.  If you have any questions or concerns, please do not hesitate to contact me at any time.     Sincerely,  Cristofer Greenberg, DO

## 2018-03-01 PROBLEM — F10.19: Status: ACTIVE | Noted: 2018-03-01

## 2018-03-01 NOTE — ASSESSMENT & PLAN NOTE
Spoke very frankly with him and his wife about his drinking - he has a significant history of drinking, and even though it is less than previous levels is on a daily basis, in the setting of medications which will not interact well.  Very concerned about his risk of withdrawal and seizure or toxicity with medications.  Counseled him regarding inpatient treatment facilities, and inpatient detox centers.  I gave him some resources and encouraged him to go for detox as soon as possible.  He is concerned about financial issues, new business etc. but will consider within the next few weeks.

## 2018-09-10 ENCOUNTER — TELEPHONE (OUTPATIENT)
Dept: NEUROLOGY | Facility: CLINIC | Age: 58
End: 2018-09-10
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
## 2018-09-10 NOTE — TELEPHONE ENCOUNTER
Spoke with sister Abril, she stated she was trying to get in touch with Dr Hart office, not Dr Greenberg's office to make an appointment. She want to be seen here on the [facility].

## 2018-09-12 ENCOUNTER — TELEPHONE (OUTPATIENT)
Dept: NEUROLOGY | Facility: CLINIC | Age: 58
End: 2018-09-12
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
## 2018-09-14 ENCOUNTER — TELEPHONE (OUTPATIENT)
Dept: NEUROSURGERY | Facility: CLINIC | Age: 58
End: 2018-09-14
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
## 2018-09-17 ENCOUNTER — TELEPHONE (OUTPATIENT)
Dept: NEUROSURGERY | Facility: CLINIC | Age: 58
End: 2018-09-17
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
## 2018-09-19 ENCOUNTER — INITIAL CONSULT (OUTPATIENT)
Dept: NEUROSURGERY | Facility: CLINIC | Age: 58
End: 2018-09-19
Payer: MEDICAID

## 2018-09-19 ENCOUNTER — HOSPITAL ENCOUNTER (OUTPATIENT)
Dept: RADIOLOGY | Facility: HOSPITAL | Age: 58
Discharge: HOME | End: 2018-09-19
Attending: PHYSICIAN ASSISTANT
Payer: MEDICAID

## 2018-09-19 VITALS
HEART RATE: 90 BPM | HEIGHT: 70 IN | SYSTOLIC BLOOD PRESSURE: 123 MMHG | WEIGHT: 220 LBS | BODY MASS INDEX: 31.5 KG/M2 | DIASTOLIC BLOOD PRESSURE: 72 MMHG

## 2018-09-19 DIAGNOSIS — G93.89 MIDLINE SHIFT OF BRAIN DUE TO HEMATOMA: ICD-10-CM

## 2018-09-19 DIAGNOSIS — S06.2X0S MIDLINE SHIFT OF BRAIN DUE TO HEMATOMA: ICD-10-CM

## 2018-09-19 DIAGNOSIS — I62.03 CHRONIC SUBDURAL HEMATOMA: Primary | ICD-10-CM

## 2018-09-19 DIAGNOSIS — Z01.818 PRE-OP EVALUATION: Primary | ICD-10-CM

## 2018-09-19 DIAGNOSIS — Z87.820 HISTORY OF RECENT TRAUMATIC INJURY OF HEAD: ICD-10-CM

## 2018-09-19 DIAGNOSIS — G44.309 POST-CONCUSSION HEADACHE: ICD-10-CM

## 2018-09-19 DIAGNOSIS — I62.03 CHRONIC SUBDURAL HEMATOMA: ICD-10-CM

## 2018-09-19 PROCEDURE — 70450 CT HEAD/BRAIN W/O DYE: CPT | Mod: 26,,, | Performed by: RADIOLOGY

## 2018-09-19 PROCEDURE — 70450 CT HEAD/BRAIN W/O DYE: CPT | Mod: TC,PO

## 2018-09-19 PROCEDURE — 99999 PR PBB SHADOW E&M-EST. PATIENT-LVL V: CPT | Mod: PBBFAC,,, | Performed by: PHYSICIAN ASSISTANT

## 2018-09-19 PROCEDURE — 99204 OFFICE O/P NEW MOD 45 MIN: CPT | Mod: S$PBB,,, | Performed by: PHYSICIAN ASSISTANT

## 2018-09-19 PROCEDURE — 99215 OFFICE O/P EST HI 40 MIN: CPT | Mod: PBBFAC,25,PN | Performed by: PHYSICIAN ASSISTANT
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
## 2018-09-20 ENCOUNTER — TELEPHONE (OUTPATIENT)
Dept: NEUROSURGERY | Facility: CLINIC | Age: 58
End: 2018-09-20

## 2018-09-20 DIAGNOSIS — S06.5XAA SUBDURAL HEMATOMA: Primary | ICD-10-CM

## 2018-09-20 NOTE — TELEPHONE ENCOUNTER
Called patient to discuss Head CT results. Subdural is improving. Will cancel plans for lauren holes. Will set up follow up with the [facility].    Marie Kathleen PA-C  Neurosurgery - [facility]

## 2018-09-24 ENCOUNTER — TELEPHONE (OUTPATIENT)
Dept: NEUROSURGERY | Facility: CLINIC | Age: 58
End: 2018-09-24

## 2018-09-24 NOTE — TELEPHONE ENCOUNTER
This patient's wife called to see what to do about the concussion clinic he was supposed to attend. She stated he was sent back to the ED because he had a big bump on his head.

## 2018-10-26 ENCOUNTER — TELEPHONE (OUTPATIENT)
Dept: NEUROSURGERY | Facility: CLINIC | Age: 58
End: 2018-10-26

## 2018-10-26 NOTE — TELEPHONE ENCOUNTER
Called pt's wife back to inform that we mailed some discs back in September. Pt states they never received discs and that must be what is at the post office.

## 2018-10-26 NOTE — TELEPHONE ENCOUNTER
----- Message from Rachell Miller sent at 10/26/2018 12:24 PM CDT -----  Contact: Wife Abril   Patient wife need to speak to nurse regarding a concerning a postage,wife will like to know what it is postage    Please call to advice [phone]

## 2018-11-28 ENCOUNTER — OFFICE VISIT (OUTPATIENT)
Dept: NEUROLOGY | Facility: CLINIC | Age: 58
End: 2018-11-28
Payer: MEDICAID

## 2018-11-28 ENCOUNTER — TELEPHONE (OUTPATIENT)
Dept: NEUROLOGY | Facility: CLINIC | Age: 58
End: 2018-11-28

## 2018-11-28 VITALS
BODY MASS INDEX: 35.13 KG/M2 | HEIGHT: 70 IN | DIASTOLIC BLOOD PRESSURE: 82 MMHG | HEART RATE: 66 BPM | SYSTOLIC BLOOD PRESSURE: 152 MMHG | WEIGHT: 245.38 LBS

## 2018-11-28 DIAGNOSIS — F07.81 POSTCONCUSSION SYNDROME: ICD-10-CM

## 2018-11-28 DIAGNOSIS — S06.0X0S: Primary | ICD-10-CM

## 2018-11-28 DIAGNOSIS — I10 ESSENTIAL HYPERTENSION: ICD-10-CM

## 2018-11-28 DIAGNOSIS — S09.90XA HEAD TRAUMA, INITIAL ENCOUNTER: ICD-10-CM

## 2018-11-28 PROBLEM — E11.9 DIABETES MELLITUS, TYPE 2: Status: ACTIVE | Noted: 2018-11-28

## 2018-11-28 PROBLEM — S06.0XAA CEREBRAL CONCUSSION: Status: ACTIVE | Noted: 2018-11-28

## 2018-11-28 PROCEDURE — 99213 OFFICE O/P EST LOW 20 MIN: CPT | Mod: PBBFAC | Performed by: PSYCHIATRY & NEUROLOGY

## 2018-11-28 PROCEDURE — 99215 OFFICE O/P EST HI 40 MIN: CPT | Mod: S$PBB,,, | Performed by: PSYCHIATRY & NEUROLOGY

## 2018-11-28 PROCEDURE — 99999 PR PBB SHADOW E&M-EST. PATIENT-LVL III: CPT | Mod: PBBFAC,,, | Performed by: PSYCHIATRY & NEUROLOGY

## 2018-11-28 RX ORDER — AMLODIPINE BESYLATE 10 MG/1
10 TABLET ORAL
COMMUNITY

## 2018-11-28 RX ORDER — LOSARTAN POTASSIUM 25 MG/1
25 TABLET ORAL
COMMUNITY

## 2018-11-28 RX ORDER — FAMOTIDINE 20 MG/1
TABLET, FILM COATED ORAL
Refills: 0 | COMMUNITY
Start: 2018-08-30 | End: 2018-11-28

## 2018-11-28 RX ORDER — PRAZOSIN HYDROCHLORIDE 1 MG/1
1 CAPSULE ORAL NIGHTLY
Refills: 3 | COMMUNITY
Start: 2018-11-19

## 2018-11-28 RX ORDER — GABAPENTIN 300 MG/1
300 CAPSULE ORAL
COMMUNITY
Start: 2018-10-01

## 2018-11-28 RX ORDER — VALPROIC ACID 250 MG/1
250 CAPSULE, LIQUID FILLED ORAL 4 TIMES DAILY
COMMUNITY

## 2018-11-28 NOTE — TELEPHONE ENCOUNTER
----- Message from Leandro Whiting sent at 11/28/2018  1:28 PM CST -----  Contact: spouse  Requesting call back regarding questions relating to pt. Please call back at [phone].      Thanks,  Leandro Whiting

## 2018-11-28 NOTE — LETTER
November 28, 2018    Randy Deng  [address]  [address]  [place] [address]             [facility]  [address] [place] [id number]  Phone: [phone]  Fax: [phone] Dear Mr. Deng:    You have suffered a cerebral concussion with  subdural hematomas.  The subdural hematomas are resolving, but you have developed a post concussion syndrome.  The post concussion syndrome will take a very long time to completely resolve, perhaps as long as a year.      If you have any questions or concerns, please don't hesitate to call.    Sincerely,        Jeff Diaz MD
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
## 2018-11-29 ENCOUNTER — HOSPITAL ENCOUNTER (OUTPATIENT)
Dept: RADIOLOGY | Facility: HOSPITAL | Age: 58
Discharge: HOME | End: 2018-11-29
Attending: PSYCHIATRY & NEUROLOGY
Payer: MEDICAID

## 2018-11-29 DIAGNOSIS — S09.90XA HEAD TRAUMA, INITIAL ENCOUNTER: ICD-10-CM

## 2018-11-29 PROCEDURE — 70450 CT HEAD/BRAIN W/O DYE: CPT | Mod: TC

## 2018-12-03 ENCOUNTER — TELEPHONE (OUTPATIENT)
Dept: NEUROLOGY | Facility: CLINIC | Age: 58
End: 2018-12-03

## 2018-12-03 NOTE — TELEPHONE ENCOUNTER
----- Message from Jeff Diaz MD sent at 11/30/2018  8:45 AM CST -----  The CT scan shows only some changes that can occur with age.  The description given by the radiologist would suggest that there are some changes that can be seen with  small vessel vascular disease.  This frequently occurs in patients who are hypertensive, obese, and are diabetic.  There was no indication of stroke or other abnormality.

## 2021-07-29 NOTE — TELEPHONE ENCOUNTER
Patient wife, Abril called. Reports that she was seen by Marie and was supposed to go to the concussion clinic. I know glenna was working on this but is out today. Patient wife said he has a huge knot that developed on the same sight as the concussion. Reports going back to the emergency room. ER doctor said it was some sort of infection. Please advise. Patient wife phone number [phone]   Cephalic